# Patient Record
Sex: MALE | Race: WHITE | HISPANIC OR LATINO | URBAN - METROPOLITAN AREA
[De-identification: names, ages, dates, MRNs, and addresses within clinical notes are randomized per-mention and may not be internally consistent; named-entity substitution may affect disease eponyms.]

---

## 2017-09-26 ENCOUNTER — HOSPITAL ENCOUNTER (OUTPATIENT)
Facility: MEDICAL CENTER | Age: 27
End: 2017-09-26
Attending: PHYSICIAN ASSISTANT

## 2017-09-26 ENCOUNTER — OFFICE VISIT (OUTPATIENT)
Dept: URGENT CARE | Facility: PHYSICIAN GROUP | Age: 27
End: 2017-09-26

## 2017-09-26 VITALS
HEART RATE: 77 BPM | BODY MASS INDEX: 26.81 KG/M2 | DIASTOLIC BLOOD PRESSURE: 70 MMHG | WEIGHT: 181 LBS | RESPIRATION RATE: 16 BRPM | SYSTOLIC BLOOD PRESSURE: 110 MMHG | HEIGHT: 69 IN | TEMPERATURE: 99.1 F | OXYGEN SATURATION: 98 %

## 2017-09-26 DIAGNOSIS — Z20.2 POSSIBLE EXPOSURE TO STD: ICD-10-CM

## 2017-09-26 PROCEDURE — 87491 CHLMYD TRACH DNA AMP PROBE: CPT

## 2017-09-26 PROCEDURE — 99203 OFFICE O/P NEW LOW 30 MIN: CPT | Performed by: PHYSICIAN ASSISTANT

## 2017-09-26 PROCEDURE — 87591 N.GONORRHOEAE DNA AMP PROB: CPT

## 2017-09-26 ASSESSMENT — PAIN SCALES - GENERAL: PAINLEVEL: NO PAIN

## 2017-09-26 NOTE — PROGRESS NOTES
"Chief Complaint   Patient presents with   • Exposure to STD     was treated 2 months ago       HISTORY OF PRESENT ILLNESS: Patient is a 26 y.o. male who presents today for the following:    Patient comes in to be retested for chlamydia. Patient's girlfriend was positive back in March and they were both treated. He was asymptomatic but received treatment anyway. They were told that he needed to be  retested. He denies any symptoms at this time including dysuria, penile drainage, rashes, abdominal pain, flank pain, nausea, vomiting, and fever.     There are no active problems to display for this patient.      Allergies:Review of patient's allergies indicates no known allergies.    No current Epic-ordered outpatient prescriptions on file.     No current Epic-ordered facility-administered medications on file.        No past medical history on file.    Social History   Substance Use Topics   • Smoking status: Former Smoker     Types: Cigarettes   • Smokeless tobacco: Never Used   • Alcohol use Yes      Comment: occ       No family status information on file.   No family history on file.    ROS:    Review of Systems   Constitutional: Negative for fever, chills, weight loss and malaise/fatigue.   HENT: Negative for ear pain, nosebleeds, congestion, sore throat and neck pain.    Eyes: Negative for blurred vision.   Respiratory: Negative for cough, sputum production, shortness of breath and wheezing.    Cardiovascular: Negative for chest pain, palpitations, orthopnea and leg swelling.   Gastrointestinal: Negative for heartburn, nausea, vomiting and abdominal pain.   Genitourinary: Negative for dysuria, urgency and frequency.       Exam:  Blood pressure 110/70, pulse 77, temperature 37.3 °C (99.1 °F), resp. rate 16, height 1.753 m (5' 9\"), weight 82.1 kg (181 lb), SpO2 98 %.  General: Well developed, well nourished. No distress.  HEENT: Head is normal.  Pulmonary: Clear to ausculation and percussion.  Normal effort. No rales, " ronchi, or wheezing.   Cardiovascular: Regular rate and rhythm without murmur. No edema.   Neurologic: Grossly nonfocal.   Skin: Warm, dry, good turgor. No rashes in visible areas.   Psych: Normal mood. Alert and oriented x3. Judgment and insight is normal.    Urine collected for culture    Assessment/Plan:  Discussed the importance of testing for HIV and syphilis and have recommended having this done at the local health department in order to be more cost effective for him. Patient verbalizes understanding and agrees with plan of care. Will contact patient with culture results. He advised that we may leave a detailed message on his cell phone, [phone].  1. Possible exposure to STD  CHLAMYDIA/GC PCR URINE OR SWAB

## 2017-09-27 LAB
C TRACH DNA SPEC QL NAA+PROBE: NEGATIVE
N GONORRHOEA DNA SPEC QL NAA+PROBE: NEGATIVE
SPECIMEN SOURCE: NORMAL